# Patient Record
Sex: MALE | Race: WHITE | NOT HISPANIC OR LATINO | Employment: FULL TIME | URBAN - METROPOLITAN AREA
[De-identification: names, ages, dates, MRNs, and addresses within clinical notes are randomized per-mention and may not be internally consistent; named-entity substitution may affect disease eponyms.]

---

## 2022-02-04 ENCOUNTER — OFFICE VISIT (OUTPATIENT)
Dept: FAMILY MEDICINE | Facility: CLINIC | Age: 67
End: 2022-02-04
Payer: COMMERCIAL

## 2022-02-04 VITALS
WEIGHT: 315 LBS | RESPIRATION RATE: 20 BRPM | HEIGHT: 76 IN | BODY MASS INDEX: 38.36 KG/M2 | DIASTOLIC BLOOD PRESSURE: 112 MMHG | HEART RATE: 86 BPM | SYSTOLIC BLOOD PRESSURE: 178 MMHG | OXYGEN SATURATION: 93 % | TEMPERATURE: 98.2 F

## 2022-02-04 DIAGNOSIS — E66.01 MORBID OBESITY (H): ICD-10-CM

## 2022-02-04 DIAGNOSIS — I10 BENIGN ESSENTIAL HYPERTENSION: ICD-10-CM

## 2022-02-04 DIAGNOSIS — R73.9 HYPERGLYCEMIA: ICD-10-CM

## 2022-02-04 DIAGNOSIS — Z13.220 LIPID SCREENING: ICD-10-CM

## 2022-02-04 DIAGNOSIS — B35.1 ONYCHOMYCOSIS: ICD-10-CM

## 2022-02-04 DIAGNOSIS — I89.0 LYMPHEDEMA: ICD-10-CM

## 2022-02-04 DIAGNOSIS — L03.115 CELLULITIS OF RIGHT LOWER EXTREMITY: Primary | ICD-10-CM

## 2022-02-04 LAB
ALBUMIN SERPL-MCNC: 3.5 G/DL (ref 3.4–5)
ALP SERPL-CCNC: 53 U/L (ref 40–150)
ALT SERPL W P-5'-P-CCNC: 29 U/L (ref 0–70)
ANION GAP SERPL CALCULATED.3IONS-SCNC: 2 MMOL/L (ref 3–14)
AST SERPL W P-5'-P-CCNC: 15 U/L (ref 0–45)
BASOPHILS # BLD AUTO: 0 10E3/UL (ref 0–0.2)
BASOPHILS NFR BLD AUTO: 1 %
BILIRUB SERPL-MCNC: 0.5 MG/DL (ref 0.2–1.3)
BUN SERPL-MCNC: 14 MG/DL (ref 7–30)
CALCIUM SERPL-MCNC: 9.1 MG/DL (ref 8.5–10.1)
CHLORIDE BLD-SCNC: 108 MMOL/L (ref 94–109)
CHOLEST SERPL-MCNC: 145 MG/DL
CO2 SERPL-SCNC: 32 MMOL/L (ref 20–32)
CREAT SERPL-MCNC: 0.9 MG/DL (ref 0.66–1.25)
EOSINOPHIL # BLD AUTO: 0.2 10E3/UL (ref 0–0.7)
EOSINOPHIL NFR BLD AUTO: 4 %
ERYTHROCYTE [DISTWIDTH] IN BLOOD BY AUTOMATED COUNT: 14.7 % (ref 10–15)
FASTING STATUS PATIENT QL REPORTED: NO
GFR SERPL CREATININE-BSD FRML MDRD: >90 ML/MIN/1.73M2
GLUCOSE BLD-MCNC: 121 MG/DL (ref 70–99)
HBA1C MFR BLD: 6 % (ref 0–5.6)
HCT VFR BLD AUTO: 48.2 % (ref 40–53)
HDLC SERPL-MCNC: 50 MG/DL
HGB BLD-MCNC: 15.6 G/DL (ref 13.3–17.7)
LDLC SERPL CALC-MCNC: 82 MG/DL
LYMPHOCYTES # BLD AUTO: 1.3 10E3/UL (ref 0.8–5.3)
LYMPHOCYTES NFR BLD AUTO: 21 %
MCH RBC QN AUTO: 29.7 PG (ref 26.5–33)
MCHC RBC AUTO-ENTMCNC: 32.4 G/DL (ref 31.5–36.5)
MCV RBC AUTO: 92 FL (ref 78–100)
MONOCYTES # BLD AUTO: 0.6 10E3/UL (ref 0–1.3)
MONOCYTES NFR BLD AUTO: 10 %
NEUTROPHILS # BLD AUTO: 4 10E3/UL (ref 1.6–8.3)
NEUTROPHILS NFR BLD AUTO: 65 %
NONHDLC SERPL-MCNC: 95 MG/DL
PLATELET # BLD AUTO: 176 10E3/UL (ref 150–450)
POTASSIUM BLD-SCNC: 4.2 MMOL/L (ref 3.4–5.3)
PROT SERPL-MCNC: 7 G/DL (ref 6.8–8.8)
RBC # BLD AUTO: 5.25 10E6/UL (ref 4.4–5.9)
SODIUM SERPL-SCNC: 142 MMOL/L (ref 133–144)
TRIGL SERPL-MCNC: 66 MG/DL
WBC # BLD AUTO: 6.2 10E3/UL (ref 4–11)

## 2022-02-04 PROCEDURE — 80053 COMPREHEN METABOLIC PANEL: CPT | Performed by: INTERNAL MEDICINE

## 2022-02-04 PROCEDURE — 36415 COLL VENOUS BLD VENIPUNCTURE: CPT | Performed by: INTERNAL MEDICINE

## 2022-02-04 PROCEDURE — 83036 HEMOGLOBIN GLYCOSYLATED A1C: CPT | Performed by: INTERNAL MEDICINE

## 2022-02-04 PROCEDURE — 85025 COMPLETE CBC W/AUTO DIFF WBC: CPT | Performed by: INTERNAL MEDICINE

## 2022-02-04 PROCEDURE — 80061 LIPID PANEL: CPT | Performed by: INTERNAL MEDICINE

## 2022-02-04 PROCEDURE — 99204 OFFICE O/P NEW MOD 45 MIN: CPT | Performed by: INTERNAL MEDICINE

## 2022-02-04 RX ORDER — LOSARTAN POTASSIUM AND HYDROCHLOROTHIAZIDE 12.5; 5 MG/1; MG/1
1 TABLET ORAL DAILY
Qty: 90 TABLET | Refills: 1 | Status: SHIPPED | OUTPATIENT
Start: 2022-02-04 | End: 2022-08-04

## 2022-02-04 RX ORDER — DOXYCYCLINE HYCLATE 100 MG
100 TABLET ORAL 2 TIMES DAILY
Qty: 14 TABLET | Refills: 0 | Status: SHIPPED | OUTPATIENT
Start: 2022-02-04 | End: 2022-02-11

## 2022-02-04 ASSESSMENT — PAIN SCALES - GENERAL: PAINLEVEL: NO PAIN (0)

## 2022-02-04 ASSESSMENT — MIFFLIN-ST. JEOR: SCORE: 2705.89

## 2022-02-04 NOTE — PATIENT INSTRUCTIONS
LEG INFECTION:  -START DOXYCYCLINE twice a day for a week.  -Continue the compression. Put the legs up when you can  -IF things are not improving please let us know!    BLOOD PRESSURE:  START- LOSARTAN/HCTZ once a day every morning.    There are many other issues that we should begin to tackle.  -preventive care (cancer screenings)  -metabolism evaluation/weight   -toe fungus    I recommend being seen again in 2-3 weeks to check on the infection and blood pressure!

## 2022-02-04 NOTE — PROGRESS NOTES
"Joey Lyle is a 66 year old patient who comes in today for a Clinic visit.  Initial BP:  BP (!) 173/117 (BP Location: Right arm, Cuff Size: Adult Large)   Pulse 86   Temp 98.2  F (36.8  C) (Temporal)   Resp 20   Ht 1.93 m (6' 4\")   Wt (!) 181.4 kg (400 lb)   SpO2 93%   BMI 48.69 kg/m       86  Disposition: provider notified while patient in the clinic    " Post-Care Instructions: Patient instructed to not lie down for 4 hours and limit physical activity for 24 hours. Patient instructed not to travel by airplane for 48 hours.

## 2022-02-04 NOTE — PROGRESS NOTES
Assessment & Plan     This is a 66-year-old morbidly obese gentleman who has not really had routine medical care.  Although he is here for a cellulitis of the right lower extremity he has findings and features that do require further evaluation.  Namely screening for diabetes sleep apnea heart disease, and aggressive interventions for weight loss.    Cellulitis of right lower extremity  Patient has evidence of lower extremity cellulitis.  He does have significant bipedal edema, which may be from lymphedema.  This would of course be secondary to his morbid obesity.  We will start antibiotics, compression and elevation.  Signs and symptoms of worsening were discussed with the patient.  Return precautions discussed.  - CBC with platelets and differential  - Comprehensive metabolic panel (BMP + Alb, Alk Phos, ALT, AST, Total. Bili, TP)  - doxycycline hyclate (VIBRA-TABS) 100 MG tablet  Dispense: 14 tablet; Refill: 0    Lymphedema  Likely due to morbid obesity.  Cardiorespiratory exam is benign today.  However given the edema and his body habitus further work-up is warranted.  I suspect at the very least he has obstructive sleep apnea.  Patient declines a thorough work-up today, citing cost barriers and insurance.  He is keen on expectant management until he is Medicare eligible.  - CBC with platelets and differential  - Comprehensive metabolic panel (BMP + Alb, Alk Phos, ALT, AST, Total. Bili, TP)    Morbid obesity (H)  Weight loss is of course encouraged.  The patient states he plans to lose about 2 to 3 pounds a week.  We will assess his progress at his next follow-up.    Benign essential hypertension  Substantially high.  We did discuss signs of ACS and CVA.  He does agree to medication, and we are choosing a diuretic component due to the lymphedema.  - losartan-hydrochlorothiazide (HYZAAR) 50-12.5 MG tablet  Dispense: 90 tablet; Refill: 1    Onychomycosis  This should be addressed as he has onychomycosis of all  "toes.  Potential source of the cellulitis.  I think he should see podiatry but he is not ready to move forward with that today.  - CBC with platelets and differential  - Comprehensive metabolic panel (BMP + Alb, Alk Phos, ALT, AST, Total. Bili, TP)    Lipid screening    - Lipid panel reflex to direct LDL Fasting        52 minutes spent on the date of the encounter doing chart review, review of outside records, patient visit and documentation        BMI:   Estimated body mass index is 48.69 kg/m  as calculated from the following:    Height as of this encounter: 1.93 m (6' 4\").    Weight as of this encounter: 181.4 kg (400 lb).   Weight management plan: Discussed healthy diet and exercise guidelines Will need to discuss further    See AVS    Return in about 2 weeks (around 2/18/2022) for Follow up.    Boris Wilcox MD  [facility] JOSE Cook is a 66 year old who presents for the following health issues     HPI     Edema, Cellulitis concerns of the lower right leg.     Last week he noted redness and swelling in the RLE.    No chills, fever, diarrhea    LLE cellulitis requiring hospital stay in 2012 ([place])        Review of Systems   Constitutional, HEENT, cardiovascular, pulmonary, gi and gu systems are negative, except as otherwise noted.      Objective    BP (!) 173/117 (BP Location: Right arm, Cuff Size: Adult Large)   Pulse 86   Temp 98.2  F (36.8  C) (Temporal)   Resp 20   Ht 1.93 m (6' 4\")   Wt (!) 181.4 kg (400 lb)   SpO2 93%   BMI 48.69 kg/m    Body mass index is 48.69 kg/m .  Physical Exam   GENERAL: healthy, alert and no distress  NECK: no adenopathy, no asymmetry, masses, or scars and thyroid normal to palpation  RESP: lungs clear to auscultation - no rales, rhonchi or wheezes  CV: regular rate and rhythm, normal S1 S2, no S3 or S4, no murmur, click or rub, no peripheral edema and peripheral pulses strong  ABDOMEN: soft, nontender, no hepatosplenomegaly, no masses and " bowel sounds normal  MS: no gross musculoskeletal defects noted, no edema  SKIN: no suspicious lesions or rashes.  Stasis dermatitis LLE.  RLE erythema and warmth c/w cellulitis.  LYMPH: no cervical, supraclavicular, axillary, or inguinal adenopathy.    EXT:  BIPEDAL PITTING EDEMA.  Marked onychomycosis B

## 2022-02-06 PROBLEM — R73.9 HYPERGLYCEMIA: Status: ACTIVE | Noted: 2022-02-06

## 2022-02-11 ENCOUNTER — TELEPHONE (OUTPATIENT)
Dept: FAMILY MEDICINE | Facility: CLINIC | Age: 67
End: 2022-02-11
Payer: COMMERCIAL

## 2022-02-11 DIAGNOSIS — L03.115 CELLULITIS OF RIGHT LOWER EXTREMITY: ICD-10-CM

## 2022-02-11 RX ORDER — DOXYCYCLINE HYCLATE 100 MG
100 TABLET ORAL 2 TIMES DAILY
Qty: 14 TABLET | Refills: 0 | Status: SHIPPED | OUTPATIENT
Start: 2022-02-11 | End: 2022-02-23

## 2022-02-11 NOTE — TELEPHONE ENCOUNTER
Pt asked if he should get a refill for doxycyline. He finished Rx today and notes improvement of right lower extremity cellulitis, but still has redness. Denies pain, fever or drainage and swelling also improved with Hyzaar.     Pt uses [id number] - [place], [place] - [address] if Rx refill is appropriate.       Please advice.

## 2022-02-11 NOTE — TELEPHONE ENCOUNTER
Triage called pt back. Left voice message informing pt doctor Brenden extended abx for 1 more week. Advised he check with pharmacy when Rx is ready for .    141

## 2022-02-23 ENCOUNTER — OFFICE VISIT (OUTPATIENT)
Dept: FAMILY MEDICINE | Facility: CLINIC | Age: 67
End: 2022-02-23
Payer: COMMERCIAL

## 2022-02-23 VITALS
TEMPERATURE: 97.1 F | WEIGHT: 315 LBS | DIASTOLIC BLOOD PRESSURE: 97 MMHG | RESPIRATION RATE: 20 BRPM | SYSTOLIC BLOOD PRESSURE: 148 MMHG | BODY MASS INDEX: 38.36 KG/M2 | HEIGHT: 76 IN | OXYGEN SATURATION: 94 % | HEART RATE: 82 BPM

## 2022-02-23 DIAGNOSIS — I89.0 LYMPHEDEMA: ICD-10-CM

## 2022-02-23 DIAGNOSIS — I10 BENIGN ESSENTIAL HYPERTENSION: ICD-10-CM

## 2022-02-23 DIAGNOSIS — L03.115 CELLULITIS OF RIGHT LOWER EXTREMITY: Primary | ICD-10-CM

## 2022-02-23 DIAGNOSIS — E66.01 MORBID OBESITY (H): ICD-10-CM

## 2022-02-23 PROCEDURE — 99214 OFFICE O/P EST MOD 30 MIN: CPT | Performed by: INTERNAL MEDICINE

## 2022-02-23 RX ORDER — FUROSEMIDE 40 MG
40 TABLET ORAL DAILY
Qty: 10 TABLET | Refills: 0 | Status: SHIPPED | OUTPATIENT
Start: 2022-02-23 | End: 2022-03-05

## 2022-02-23 ASSESSMENT — PAIN SCALES - GENERAL: PAINLEVEL: NO PAIN (0)

## 2022-02-23 NOTE — PATIENT INSTRUCTIONS
FUROSEMIDE:  Take 40mg a day for 10 days    See me again when you get onto Medicare.    We should tackle other preventive care such as sleep apnea screening, weight loss, colon cancer screening.

## 2022-02-23 NOTE — NURSING NOTE
"      Initial BP:  BP (!) 148/97 (BP Location: Left arm, Patient Position: Sitting, Cuff Size: Adult Large)   Pulse 82   Temp 97.1  F (36.2  C) (Temporal)   Resp 20   Ht 1.93 m (6' 4\")   Wt (!) 176.4 kg (389 lb)   SpO2 94%   BMI 47.35 kg/m       82  Disposition: provider notified while patient in the clinic      Iza Lee CMA    "

## 2022-02-23 NOTE — PROGRESS NOTES
"  Assessment & Plan     Cellulitis of right lower extremity  Improved.  Return precautions discussed.  No further abx indicated at this time.      Benign essential hypertension  Improved with medication. Continue.  Will add short course lasix for the persistent pedal edema.      Morbid obesity (H)  He has a recumbent bike and is planning to use.             See Patient Instructions    No follow-ups on file.    Boris Wilcox MD  [facility] JOSE Cook is a 66 year old who presents for the following health issues     History of Present Illness     Reason for visit:  Follow up to previous appointment for cellulitis.  Symptom onset:  3-4 weeks ago  Symptom intensity:  Mild  Symptom progression:  Improving  Had these symptoms before:  Yes  Has tried/received treatment for these symptoms:  Yes  Previous treatment was successful:  Yes  Prior treatment description:  Cellulitis in other leg 10 years ago 5 day stay @ [facility].  What makes it worse:  No  What makes it better:  No    He eats 2-3 servings of fruits and vegetables daily.He consumes 0 sweetened beverage(s) daily.He exercises with enough effort to increase his heart rate 9 or less minutes per day.  He exercises with enough effort to increase his heart rate 3 or less days per week.   He is taking medications regularly.       He is here in follow-up.  Seen earlier this month as a new patient.  He has morbid obesity and significant lower extremity events as a result.    We treated for cellulitis last visit and extended the course.      He also agreed to treatment for HTN.      Recall that he has not had routine primary care for years.          Review of Systems   As above      Objective    BP (!) 148/97 (BP Location: Left arm, Patient Position: Sitting, Cuff Size: Adult Large)   Pulse 82   Temp 97.1  F (36.2  C) (Temporal)   Resp 20   Ht 1.93 m (6' 4\")   Wt (!) 176.4 kg (389 lb)   SpO2 94%   BMI 47.35 kg/m    Body " mass index is 47.35 kg/m .   Wt Readings from Last 3 Encounters:   02/23/22 (!) 176.4 kg (389 lb)   02/04/22 (!) 181.4 kg (400 lb)       Physical Exam   GENERAL: healthy, alert and no distress  NECK: no adenopathy, no asymmetry, masses, or scars and thyroid normal to palpation  RESP: lungs clear to auscultation - no rales, rhonchi or wheezes  CV: regular rate and rhythm, normal S1 S2, no S3 or S4, no murmur, click or rub, no peripheral edema and peripheral pulses strong  ABDOMEN: soft, nontender, no hepatosplenomegaly, no masses and bowel sounds normal  BIPEDAL edema.  No e/o cellulitis

## 2022-05-29 ENCOUNTER — HEALTH MAINTENANCE LETTER (OUTPATIENT)
Age: 67
End: 2022-05-29

## 2022-08-04 DIAGNOSIS — I10 BENIGN ESSENTIAL HYPERTENSION: ICD-10-CM

## 2022-08-04 RX ORDER — LOSARTAN POTASSIUM AND HYDROCHLOROTHIAZIDE 12.5; 5 MG/1; MG/1
TABLET ORAL
Qty: 90 TABLET | Refills: 0 | Status: SHIPPED | OUTPATIENT
Start: 2022-08-04 | End: 2022-11-22

## 2022-08-04 NOTE — TELEPHONE ENCOUNTER
Routing refill request to provider for review/approval because:  BP out of range:        BP Readings from Last 3 Encounters:   02/23/22 (!) 148/97   02/04/22 (!) 178/112

## 2022-10-03 ENCOUNTER — HEALTH MAINTENANCE LETTER (OUTPATIENT)
Age: 67
End: 2022-10-03

## 2022-11-19 DIAGNOSIS — I10 BENIGN ESSENTIAL HYPERTENSION: ICD-10-CM

## 2022-11-22 RX ORDER — LOSARTAN POTASSIUM AND HYDROCHLOROTHIAZIDE 12.5; 5 MG/1; MG/1
TABLET ORAL
Qty: 90 TABLET | Refills: 0 | Status: SHIPPED | OUTPATIENT
Start: 2022-11-22 | End: 2023-02-27

## 2022-11-22 NOTE — TELEPHONE ENCOUNTER
Routing refill request to provider for review/approval because:  BP Readings from Last 6 Encounters:   02/23/22 (!) 148/97   02/04/22 (!) 178/112       Taya Claros RN

## 2023-01-09 NOTE — LETTER
February 4, 2022      Joey Lyle  [id number] [address] [address]        Dear ,    We are writing to inform you of your test results.    Mr. Lyle,     Most of your labs are reassuringly normal. Sugar (glucose) is a bit high. I'm having the lab run a specific diabetes test.  If there's any concern we'll reach out.             Resulted Orders   Lipid panel reflex to direct LDL Fasting   Result Value Ref Range    Cholesterol 145 <200 mg/dL    Triglycerides 66 <150 mg/dL    Direct Measure HDL 50 >=40 mg/dL    LDL Cholesterol Calculated 82 <=100 mg/dL    Non HDL Cholesterol 95 <130 mg/dL    Patient Fasting > 8hrs? No     Narrative    Cholesterol  Desirable:  <200 mg/dL    Triglycerides  Normal:  Less than 150 mg/dL  Borderline High:  150-199 mg/dL  High:  200-499 mg/dL  Very High:  Greater than or equal to 500 mg/dL    Direct Measure HDL  Female:  Greater than or equal to 50 mg/dL   Male:  Greater than or equal to 40 mg/dL    LDL Cholesterol  Desirable:  <100mg/dL  Above Desirable:  100-129 mg/dL   Borderline High:  130-159 mg/dL   High:  160-189 mg/dL   Very High:  >= 190 mg/dL    Non HDL Cholesterol  Desirable:  130 mg/dL  Above Desirable:  130-159 mg/dL  Borderline High:  160-189 mg/dL  High:  190-219 mg/dL  Very High:  Greater than or equal to 220 mg/dL   Comprehensive metabolic panel (BMP + Alb, Alk Phos, ALT, AST, Total. Bili, TP)   Result Value Ref Range    Sodium 142 133 - 144 mmol/L    Potassium 4.2 3.4 - 5.3 mmol/L    Chloride 108 94 - 109 mmol/L    Carbon Dioxide (CO2) 32 20 - 32 mmol/L    Anion Gap 2 (L) 3 - 14 mmol/L    Urea Nitrogen 14 7 - 30 mg/dL    Creatinine 0.90 0.66 - 1.25 mg/dL    Calcium 9.1 8.5 - 10.1 mg/dL    Glucose 121 (H) 70 - 99 mg/dL    Alkaline Phosphatase 53 40 - 150 U/L    AST 15 0 - 45 U/L    ALT 29 0 - 70 U/L    Protein Total 7.0 6.8 - 8.8 g/dL    Albumin 3.5 3.4 - 5.0 g/dL    Bilirubin Total 0.5 0.2 - 1.3 mg/dL    GFR Estimate >90 >60 mL/min/1.73m2      Comment:       Effective December 21, 2021 eGFRcr in adults is calculated using the 2021 CKD-EPI creatinine equation which includes age and gender (Ani et al., NEJ, DOI: 10.1056/YEEJus8816108)   CBC with platelets and differential   Result Value Ref Range    WBC Count 6.2 4.0 - 11.0 10e3/uL    RBC Count 5.25 4.40 - 5.90 10e6/uL    Hemoglobin 15.6 13.3 - 17.7 g/dL    Hematocrit 48.2 40.0 - 53.0 %    MCV 92 78 - 100 fL    MCH 29.7 26.5 - 33.0 pg    MCHC 32.4 31.5 - 36.5 g/dL    RDW 14.7 10.0 - 15.0 %    Platelet Count 176 150 - 450 10e3/uL    % Neutrophils 65 %    % Lymphocytes 21 %    % Monocytes 10 %    % Eosinophils 4 %    % Basophils 1 %    Absolute Neutrophils 4.0 1.6 - 8.3 10e3/uL    Absolute Lymphocytes 1.3 0.8 - 5.3 10e3/uL    Absolute Monocytes 0.6 0.0 - 1.3 10e3/uL    Absolute Eosinophils 0.2 0.0 - 0.7 10e3/uL    Absolute Basophils 0.0 0.0 - 0.2 10e3/uL       If you have any questions or concerns, please call the clinic at the number listed above.       Sincerely,      Boris Wilcox MD           [phone]

## 2023-02-12 ENCOUNTER — HEALTH MAINTENANCE LETTER (OUTPATIENT)
Age: 68
End: 2023-02-12

## 2023-02-25 DIAGNOSIS — I10 BENIGN ESSENTIAL HYPERTENSION: ICD-10-CM

## 2023-02-27 RX ORDER — LOSARTAN POTASSIUM AND HYDROCHLOROTHIAZIDE 12.5; 5 MG/1; MG/1
TABLET ORAL
Qty: 30 TABLET | Refills: 0 | Status: SHIPPED | OUTPATIENT
Start: 2023-02-27 | End: 2023-03-30

## 2023-03-30 ENCOUNTER — TELEPHONE (OUTPATIENT)
Dept: FAMILY MEDICINE | Facility: CLINIC | Age: 68
End: 2023-03-30
Payer: COMMERCIAL

## 2023-03-30 DIAGNOSIS — I10 BENIGN ESSENTIAL HYPERTENSION: ICD-10-CM

## 2023-03-30 RX ORDER — LOSARTAN POTASSIUM AND HYDROCHLOROTHIAZIDE 12.5; 5 MG/1; MG/1
TABLET ORAL
Qty: 30 TABLET | Refills: 0 | Status: SHIPPED | OUTPATIENT
Start: 2023-03-30 | End: 2023-04-27

## 2023-03-30 NOTE — TELEPHONE ENCOUNTER
Rx for #30 sent. Pt not seen in over a year.  Needs a visit  Boris Wilcox MD on 3/30/2023 at 3:43 PM

## 2023-04-27 DIAGNOSIS — I10 BENIGN ESSENTIAL HYPERTENSION: ICD-10-CM

## 2023-04-27 RX ORDER — LOSARTAN POTASSIUM AND HYDROCHLOROTHIAZIDE 12.5; 5 MG/1; MG/1
TABLET ORAL
Qty: 30 TABLET | Refills: 0 | Status: SHIPPED | OUTPATIENT
Start: 2023-04-27 | End: 2023-05-26

## 2023-05-26 DIAGNOSIS — I10 BENIGN ESSENTIAL HYPERTENSION: ICD-10-CM

## 2023-05-26 RX ORDER — LOSARTAN POTASSIUM AND HYDROCHLOROTHIAZIDE 12.5; 5 MG/1; MG/1
TABLET ORAL
Qty: 30 TABLET | Refills: 0 | Status: SHIPPED | OUTPATIENT
Start: 2023-05-26 | End: 2023-06-23

## 2023-06-23 DIAGNOSIS — I10 BENIGN ESSENTIAL HYPERTENSION: ICD-10-CM

## 2023-06-23 RX ORDER — LOSARTAN POTASSIUM AND HYDROCHLOROTHIAZIDE 12.5; 5 MG/1; MG/1
TABLET ORAL
Qty: 30 TABLET | Refills: 0 | Status: SHIPPED | OUTPATIENT
Start: 2023-06-23 | End: 2023-07-18

## 2023-07-18 DIAGNOSIS — I10 BENIGN ESSENTIAL HYPERTENSION: ICD-10-CM

## 2023-07-18 RX ORDER — LOSARTAN POTASSIUM AND HYDROCHLOROTHIAZIDE 12.5; 5 MG/1; MG/1
TABLET ORAL
Qty: 30 TABLET | Refills: 0 | Status: SHIPPED | OUTPATIENT
Start: 2023-07-18 | End: 2023-08-03

## 2023-08-03 DIAGNOSIS — I10 BENIGN ESSENTIAL HYPERTENSION: ICD-10-CM

## 2023-08-03 RX ORDER — LOSARTAN POTASSIUM AND HYDROCHLOROTHIAZIDE 12.5; 5 MG/1; MG/1
TABLET ORAL
Qty: 30 TABLET | Refills: 0 | Status: SHIPPED | OUTPATIENT
Start: 2023-08-03 | End: 2023-11-24

## 2023-08-28 DIAGNOSIS — I10 BENIGN ESSENTIAL HYPERTENSION: ICD-10-CM

## 2023-08-29 RX ORDER — LOSARTAN POTASSIUM AND HYDROCHLOROTHIAZIDE 12.5; 5 MG/1; MG/1
TABLET ORAL
Qty: 90 TABLET | Refills: 1 | OUTPATIENT
Start: 2023-08-29

## 2023-11-23 ENCOUNTER — TELEPHONE (OUTPATIENT)
Dept: FAMILY MEDICINE | Facility: CLINIC | Age: 68
End: 2023-11-23
Payer: COMMERCIAL

## 2023-11-23 DIAGNOSIS — I10 BENIGN ESSENTIAL HYPERTENSION: ICD-10-CM

## 2023-11-24 RX ORDER — LOSARTAN POTASSIUM AND HYDROCHLOROTHIAZIDE 12.5; 5 MG/1; MG/1
TABLET ORAL
Qty: 30 TABLET | Refills: 0 | Status: SHIPPED | OUTPATIENT
Start: 2023-11-24 | End: 2023-12-21

## 2023-12-21 DIAGNOSIS — I10 BENIGN ESSENTIAL HYPERTENSION: ICD-10-CM

## 2023-12-21 RX ORDER — LOSARTAN POTASSIUM AND HYDROCHLOROTHIAZIDE 12.5; 5 MG/1; MG/1
TABLET ORAL
Qty: 30 TABLET | Refills: 0 | Status: SHIPPED | OUTPATIENT
Start: 2023-12-21 | End: 2024-01-17

## 2024-01-17 DIAGNOSIS — I10 BENIGN ESSENTIAL HYPERTENSION: ICD-10-CM

## 2024-01-17 RX ORDER — LOSARTAN POTASSIUM AND HYDROCHLOROTHIAZIDE 12.5; 5 MG/1; MG/1
TABLET ORAL
Qty: 90 TABLET | Refills: 1 | Status: SHIPPED | OUTPATIENT
Start: 2024-01-17 | End: 2024-07-12

## 2024-03-10 ENCOUNTER — HEALTH MAINTENANCE LETTER (OUTPATIENT)
Age: 69
End: 2024-03-10

## 2024-07-12 DIAGNOSIS — I10 BENIGN ESSENTIAL HYPERTENSION: ICD-10-CM

## 2024-07-12 RX ORDER — LOSARTAN POTASSIUM AND HYDROCHLOROTHIAZIDE 12.5; 5 MG/1; MG/1
TABLET ORAL
Qty: 90 TABLET | Refills: 1 | Status: SHIPPED | OUTPATIENT
Start: 2024-07-12

## 2024-10-10 ENCOUNTER — TRANSFERRED RECORDS (OUTPATIENT)
Dept: HEALTH INFORMATION MANAGEMENT | Facility: CLINIC | Age: 69
End: 2024-10-10
Payer: COMMERCIAL

## 2025-06-21 ENCOUNTER — OFFICE VISIT (OUTPATIENT)
Dept: ORTHOPEDICS | Facility: CLINIC | Age: 70
End: 2025-06-21
Payer: COMMERCIAL

## 2025-06-21 VITALS — HEIGHT: 76 IN | WEIGHT: 315 LBS | BODY MASS INDEX: 38.36 KG/M2

## 2025-06-21 DIAGNOSIS — M79.604 PAIN AND SWELLING OF RIGHT LOWER EXTREMITY: Primary | ICD-10-CM

## 2025-06-21 DIAGNOSIS — L03.115 CELLULITIS OF RIGHT LOWER EXTREMITY: ICD-10-CM

## 2025-06-21 DIAGNOSIS — M79.89 PAIN AND SWELLING OF RIGHT LOWER EXTREMITY: Primary | ICD-10-CM

## 2025-06-21 PROCEDURE — 99203 OFFICE O/P NEW LOW 30 MIN: CPT | Performed by: STUDENT IN AN ORGANIZED HEALTH CARE EDUCATION/TRAINING PROGRAM

## 2025-06-21 RX ORDER — CEPHALEXIN 500 MG/1
500 CAPSULE ORAL EVERY 6 HOURS
Qty: 28 CAPSULE | Refills: 0 | Status: SHIPPED | OUTPATIENT
Start: 2025-06-21 | End: 2025-06-28

## 2025-06-21 NOTE — PATIENT INSTRUCTIONS
Joey Lyle, It was nice to see you in our office today.      DIAGNOSIS:   1. Pain and swelling of right lower extremity    2. Cellulitis of right lower extremity      INSTRUCTIONS FOR FOLLOW-UP CARE:  Activity Modification: As tolerated  Imaging/Tests: Doppler Ultrasound ordered to evaluate for DVT  Medication: Keflex prescription sent to pharmacy for cellulitis   Follow-up Plan: Follow-up with PCP next week for evaluation of antibiotic response and management as needed    CLINIC LOCATIONS:     [place] [facility]:  [phone] [address], [address] [place] HELP LINE: [phone]   [place] [address] TRIAGE LINE: [phone]   (Monday & Friday) APPOINTMENTS: [phone]    RADIOLOGY: [phone]   [facility] [phone]   [address] PHYSICAL & OCCUPATIONAL THERAPY: [phone]*   [place] [address] BILLING QUESTIONS: [phone]   (Tuesday & Thursday) FAX: [phone]       *Therapy locations that offer Saturday options: [place], [place], [place]    Thank you for choosing [facility]!    If you have any questions, please do not hesitate to reach out on [employer] or Call [phone] and ask for my team.    Saida Peacock MD, [facility]

## 2025-06-21 NOTE — PROGRESS NOTES
SPORTS MEDICINE CLINIC NEW PATIENT VISIT    REFERRAL SOURCE: No ref. provider found    PATIENT'S GOAL FOR APPOINTMENT: Wants to know if the redness is concerning or not    HISTORY OF PRESENT ILLNESS  Joey Lyle is a 69 year old male presenting as a new patient with right lower leg pain    When did problem start?/Trauma associated with onset?:  6/8/2025, patient fell on concrete and scraped his right lower leg.  There is tenderness to the area and redness in the area.  Patient has a history of cellulitis to the left lower extremity.    Location & description of pain:  Right lower leg, anterior, redness and swelling    Exacerbating factors: pressure to area    Remitting factors:rest    Previous Treatments:  -Medications: antibiotic cream  -Rehabilitation: non  -Durable Medical Equipment:no  -Injections: no  -Modalities: no  -Other Providers seen: no    Sports, Hobbies, Employment:  Sales    Average hours of sleep per night: 6 + a nap for 1 hour    Average minutes of exercise per day: Daily - no    Area of Problem  Date 1  Date 2 Date 3 Date 4 Date 5   Function Ability in last week - % of Baseline (0 is worst & 100 is best)        Sport/Activity Ability in last week - % of Baseline (0 is worst & 100 is best)        Pain Level in the last week (0 is best & 10 is worst)          Additional Information of consideration:  -Poor Balance? Yes, has arthritis in right knee, being treated at [facility] for this.  -Numbness/paresthesias in extremities? no    MEDICATIONS    Current Outpatient Medications:     furosemide (LASIX) 40 MG tablet, Take 1 tablet (40 mg) by mouth daily for 10 days, Disp: 10 tablet, Rfl: 0    losartan-hydrochlorothiazide (HYZAAR) 50-12.5 MG tablet, TAKE 1 TABLET BY MOUTH EVERY DAY - OVERDUE FOR CLINIC VISIT PER MD, Disp: 90 tablet, Rfl: 1    ALLERGIES  Allergies   Allergen Reactions    Sulfa Antibiotics Dermatitis and Rash       PAST MEDICAL HISTORY  No past medical history on file.    PAST SURGICAL  HISTORY  No past surgical history on file.    SOCIAL HISTORY  Social History     Tobacco Use    Smoking status: Former     Current packs/day: 0.00     Types: Cigarettes     Quit date:      Years since quittin.4    Smokeless tobacco: Never   Substance Use Topics    Alcohol use: Yes    Drug use: Never       FAMILY HISTORY  No family history on file.    REVIEW OF SYSTEMS  Complete 12 system Review of Systems performed and was negative except for HPI.    VITALS  There were no vitals filed for this visit.    PHYSICAL EXAMINATION ***  General: Age appropriate appearing, no acute distress  HEENT: normocephalic, atraumatic, sclera non-icteric  Skin: No open skin lesion noted in visible areas.  Respiratory: Non labored breathing, No wheezes.  Cardiac/Vessels: No edema, cyanosis, clubbing noted in all extremities.  Lymph: No palpable lymph node swelling noted around the affected area.  Mental: There was no signs of aberrant behaviors noted. Patient was pleasant throughout the encounter.    Functional Movements: Non-antalgic gait appreciated. Able to complete squat, balance on each leg, walk on heels and walk on toes without difficulty.     RIGHT KNEE:   Inspection: No deformities, atrophy, effusion, warmth ***  Palpation: ***  Range of Motion (Estimated, Active unless otherwise noted):Flexion 150 degrees, extension to neutral.   Reflexes: Patella [2/2], Achilles [2/2]  Sensation:  Intact to light touch bilaterally at the anterior thigh, medial knee, medial malleoli, dorsum of foot, and lateral malleoli.  Proprioception intact in both big toes ***  Strength [L (0-5) / R (0-5)]:   Hip: Flexion [5/5], Abduction [5/5], Adduction [5/5]  Knee: Extension [5/5], Flexion [5/5]  Ankle: Eversion [5/5], Inversion [5/5], Dorsiflexion [5/5], Plantarflexion [5/5]  Great Toe: Extension [5/5]    Special Testing:   Testing: The following special tests were negative: Valgus stress test, Varus stress test, Lachman's test, Anterior  drawer test, Posterior drawer test, Meghann's test, Thessaly test,  Patellofemoral grind test    LEFT KNEE:   Inspection: No deformities, atrophy, effusion, warmth ***  Palpation: ***  Range of Motion (Estimated, Active unless otherwise noted):Flexion 150 degrees, extension to neutral.   Reflexes: Patella [2/2], Achilles [2/2]  Sensation:  Intact to light touch bilaterally at the anterior thigh, medial knee, medial malleoli, dorsum of foot, and lateral malleoli.  Proprioception intact in both big toes ***  Strength [L (0-5) / R (0-5)]:   Hip: Flexion [5/5], Abduction [5/5], Adduction [5/5]  Knee: Extension [5/5], Flexion [5/5]  Ankle: Eversion [5/5], Inversion [5/5], Dorsiflexion [5/5], Plantarflexion [5/5]  Great Toe: Extension [5/5]    Special Testing:   Testing: The following special tests were negative: Valgus stress test, Varus stress test, Lachman's test, Anterior drawer test, Posterior drawer test, Meghann's test, Thessaly test,  Patellofemoral grind test    IMAGING STUDIES:  Put Date here *** XR/MRI,etc    I personally reviewed these images and agree with the radiology report and shared the findings with the patient.    NAVNEET Lyle is a very pleasant 69 year old male who is presenting today with *** knee pain that seems related to ***    PLAN  The following was discussed with the patient:  Activity Modification: ***  Imaging/Tests: ***  Rehabilitation: ***  Orthotics/Bracing: ***  Medication: ***  Interventions: ***  Referral: ***  Follow-up Plan: ***  Resources Provided: ***    They were encouraged to message me on [employer] whenever they needed.    The patient was in agreement with this plan. All questions were answered to the best of my ability.    Saida Peacock MD, [facility]  Sports Medicine Attending Physician  Department of Physical Medicine & Rehabilitation

## 2025-06-21 NOTE — LETTER
6/21/2025      Joey Lyle  [address] [facility] [address]      Dear Colleague,    Thank you for referring your patient, Joey Lyle, to the [facility] [place]. Please see a copy of my visit note below.    SPORTS MEDICINE CLINIC NEW PATIENT VISIT    REFERRAL SOURCE: No ref. provider found    PATIENT'S GOAL FOR APPOINTMENT: Wants to know if the redness is concerning or not    HISTORY OF PRESENT ILLNESS  Joey Lyle is a 69 year old male presenting as a new patient with right lower leg pain    When did problem start?/Trauma associated with onset?:  6/8/2025, patient fell on concrete and scraped his right lower leg.  There is tenderness to the area and redness in the area.  Patient has a history of cellulitis to the left lower extremity.    Location & description of pain:  Right lower leg, anterior, redness and swelling    Exacerbating factors: pressure to area    Remitting factors:rest    Previous Treatments:  -Medications: antibiotic cream  -Rehabilitation: non  -Durable Medical Equipment:no  -Injections: no  -Modalities: no  -Other Providers seen: no    Sports, Hobbies, Employment:  Sales    Average hours of sleep per night: 6 + a nap for 1 hour    Average minutes of exercise per day: Daily - no    Area of Problem  Date 1  Date 2 Date 3 Date 4 Date 5   Function Ability in last week - % of Baseline (0 is worst & 100 is best)        Sport/Activity Ability in last week - % of Baseline (0 is worst & 100 is best)        Pain Level in the last week (0 is best & 10 is worst)          Additional Information of consideration:  -Poor Balance? Yes, has arthritis in right knee, being treated at [facility] for this.  -Numbness/paresthesias in extremities? no    MEDICATIONS    Current Outpatient Medications:      furosemide (LASIX) 40 MG tablet, Take 1 tablet (40 mg) by mouth daily for 10 days, Disp: 10 tablet, Rfl: 0     losartan-hydrochlorothiazide (HYZAAR) 50-12.5 MG tablet, TAKE 1 TABLET  BY MOUTH EVERY DAY - OVERDUE FOR CLINIC VISIT PER MD, Disp: 90 tablet, Rfl: 1    ALLERGIES  Allergies   Allergen Reactions     Sulfa Antibiotics Dermatitis and Rash       PAST MEDICAL HISTORY  No past medical history on file.    PAST SURGICAL HISTORY  No past surgical history on file.    SOCIAL HISTORY  Social History     Tobacco Use     Smoking status: Former     Current packs/day: 0.00     Types: Cigarettes     Quit date:      Years since quittin.4     Smokeless tobacco: Never   Substance Use Topics     Alcohol use: Yes     Drug use: Never       FAMILY HISTORY  No family history on file.    REVIEW OF SYSTEMS  Complete 12 system Review of Systems performed and was negative except for HPI.    VITALS  There were no vitals filed for this visit.    PHYSICAL EXAMINATION   General: Age appropriate appearing, no acute distress  HEENT: normocephalic, atraumatic, sclera non-icteric  Skin: No open skin lesion noted in visible areas.  Respiratory: Non labored breathing, No wheezes.  Cardiac/Vessels: No edema, cyanosis, clubbing noted in all extremities.  Lymph: No palpable lymph node swelling noted around the affected area.  Mental: There was no signs of aberrant behaviors noted. Patient was pleasant throughout the encounter.    Functional Movements: Non-antalgic gait appreciated.     MUSCULOSKELETAL:   Inspection: Right shin and calf erythema, warmth and swelling, pictures uploaded to patient's chart  Palpation: TTP right posterior calf    IMPRESSION  Joey Lyle is a very pleasant 69 year old male who is presenting today with right lower leg erythema with warmth and swelling concerning for cellulitis.   Superimposed DVT can't be ruled out without additional testing.    PLAN  The following was discussed with the patient:  Activity Modification: As tolerated  Imaging/Tests: Doppler Ultrasound ordered to evaluate for DVT  Medication: Keflex prescription sent to pharmacy for cellulitis   Follow-up Plan: Follow-up with  PCP next week for evaluation of antibiotic response and management as needed  Resources Provided: Written and verbal information detailing above findings and plan provided including after visit summary.    They were encouraged to message me on [employer] whenever they needed.    The patient was in agreement with this plan. All questions were answered to the best of my ability.    Total time (face-to-face and non-face-to-face) spent on today's visit was 30 minutes. This included preparation for the visit (i.e. reviewing test results), performance of a medically  appropriate history and examination, placing orders for medications, tests or other procedures, and discussing the plan of care with the patient. This time is exclusive of procedures performed and time spent teaching.     Saida Peacock MD, [facility]  Sports Medicine Attending Physician  Department of Physical Medicine & Rehabilitation        Again, thank you for allowing me to participate in the care of your patient.        Sincerely,        Saida Peacock MD    Electronically signed

## 2025-06-23 ENCOUNTER — TELEPHONE (OUTPATIENT)
Dept: ORTHOPEDICS | Facility: CLINIC | Age: 70
End: 2025-06-23
Payer: COMMERCIAL

## 2025-06-23 ENCOUNTER — ANCILLARY PROCEDURE (OUTPATIENT)
Dept: ULTRASOUND IMAGING | Facility: CLINIC | Age: 70
End: 2025-06-23
Attending: STUDENT IN AN ORGANIZED HEALTH CARE EDUCATION/TRAINING PROGRAM
Payer: COMMERCIAL

## 2025-06-23 DIAGNOSIS — M79.604 PAIN AND SWELLING OF RIGHT LOWER EXTREMITY: ICD-10-CM

## 2025-06-23 DIAGNOSIS — M79.89 PAIN AND SWELLING OF RIGHT LOWER EXTREMITY: ICD-10-CM

## 2025-06-23 PROCEDURE — 93971 EXTREMITY STUDY: CPT | Mod: RT

## 2025-06-23 NOTE — TELEPHONE ENCOUNTER
I made an outbound phone call and left a voicemail for the patient.  Brief message, no consent to communicate on file.  Recommended patient looks her [place] message from our team.    Regarding patient appointment for 6/24/2025, it is recommended that the patient finishes his antibiotics and follows up with his primary care regarding the cellulitis/ultrasound for DVT.  Primary care referral was placed at last visit, the scheduling number is [phone].     Will send patient a detailed Matchup message.  If patient calls back, please relay the message above and encourage he cancel his appointment with Dr. Yeo Zach Turner, ATC

## 2025-06-23 NOTE — TELEPHONE ENCOUNTER
I attempted to call the patient again regarding their appointment with Dr. Yeo tomorrow. No answer, left generic message to call us back or read his [place]art message from our team regarding his appointment. Makenzie Perla ATC on 6/23/2025 at 4:50 PM

## 2025-06-23 NOTE — TELEPHONE ENCOUNTER
Per Dr. Peacock, patient was started on antibiotics 6/21/25 and needs to follow up with PCP for this condition. Not appropriate follow up for sports med.   Patient will need called or messaged today.

## 2025-06-23 NOTE — TELEPHONE ENCOUNTER
Other: TE sent per protocol due to patient having  Pain and swelling of right lower extremity and Cellulitis of right lower extremity. Patient is scheduled with Dr. Yeo on 06/24/25. Please evaluate and see if appointment is appropriate.     Could we send this information to [facility] or would you prefer to receive a phone call?:   Patient would prefer a phone call   Okay to leave a detailed message?: Yes at Home number on file [phone] (home)

## 2025-06-24 ENCOUNTER — OFFICE VISIT (OUTPATIENT)
Dept: FAMILY MEDICINE | Facility: CLINIC | Age: 70
End: 2025-06-24
Payer: COMMERCIAL

## 2025-06-24 VITALS
DIASTOLIC BLOOD PRESSURE: 81 MMHG | HEART RATE: 90 BPM | TEMPERATURE: 96.3 F | WEIGHT: 315 LBS | SYSTOLIC BLOOD PRESSURE: 128 MMHG | RESPIRATION RATE: 18 BRPM | OXYGEN SATURATION: 95 % | HEIGHT: 76 IN | BODY MASS INDEX: 38.36 KG/M2

## 2025-06-24 DIAGNOSIS — I89.0 CHRONIC ACQUIRED LYMPHEDEMA: ICD-10-CM

## 2025-06-24 DIAGNOSIS — E66.01 MORBID OBESITY WITH BMI OF 50.0-59.9, ADULT (H): ICD-10-CM

## 2025-06-24 DIAGNOSIS — I10 BENIGN ESSENTIAL HYPERTENSION: Primary | ICD-10-CM

## 2025-06-24 LAB
ALBUMIN SERPL BCG-MCNC: 4.1 G/DL (ref 3.5–5.2)
ALP SERPL-CCNC: 59 U/L (ref 40–150)
ALT SERPL W P-5'-P-CCNC: 17 U/L (ref 0–70)
ANION GAP SERPL CALCULATED.3IONS-SCNC: 10 MMOL/L (ref 7–15)
AST SERPL W P-5'-P-CCNC: 20 U/L (ref 0–45)
BILIRUB SERPL-MCNC: 0.6 MG/DL
BUN SERPL-MCNC: 21.5 MG/DL (ref 8–23)
CALCIUM SERPL-MCNC: 9.6 MG/DL (ref 8.8–10.4)
CHLORIDE SERPL-SCNC: 101 MMOL/L (ref 98–107)
CREAT SERPL-MCNC: 1.18 MG/DL (ref 0.67–1.17)
EGFRCR SERPLBLD CKD-EPI 2021: 68 ML/MIN/1.73M2
GLUCOSE SERPL-MCNC: 92 MG/DL (ref 70–99)
HCO3 SERPL-SCNC: 29 MMOL/L (ref 22–29)
POTASSIUM SERPL-SCNC: 4.4 MMOL/L (ref 3.4–5.3)
PROT SERPL-MCNC: 6.8 G/DL (ref 6.4–8.3)
SODIUM SERPL-SCNC: 140 MMOL/L (ref 135–145)

## 2025-06-24 PROCEDURE — 3079F DIAST BP 80-89 MM HG: CPT | Performed by: INTERNAL MEDICINE

## 2025-06-24 PROCEDURE — 36415 COLL VENOUS BLD VENIPUNCTURE: CPT | Performed by: INTERNAL MEDICINE

## 2025-06-24 PROCEDURE — 99214 OFFICE O/P EST MOD 30 MIN: CPT | Performed by: INTERNAL MEDICINE

## 2025-06-24 PROCEDURE — 80053 COMPREHEN METABOLIC PANEL: CPT | Performed by: INTERNAL MEDICINE

## 2025-06-24 PROCEDURE — 1126F AMNT PAIN NOTED NONE PRSNT: CPT | Performed by: INTERNAL MEDICINE

## 2025-06-24 PROCEDURE — 3074F SYST BP LT 130 MM HG: CPT | Performed by: INTERNAL MEDICINE

## 2025-06-24 PROCEDURE — G2211 COMPLEX E/M VISIT ADD ON: HCPCS | Performed by: INTERNAL MEDICINE

## 2025-06-24 RX ORDER — LOSARTAN POTASSIUM AND HYDROCHLOROTHIAZIDE 12.5; 5 MG/1; MG/1
1 TABLET ORAL DAILY
Qty: 90 TABLET | Refills: 1 | Status: SHIPPED | OUTPATIENT
Start: 2025-06-24

## 2025-06-24 ASSESSMENT — PAIN SCALES - GENERAL: PAINLEVEL_OUTOF10: NO PAIN (0)

## 2025-06-24 NOTE — PROGRESS NOTES
"  Assessment & Plan     Joey Lyle is a 69-year-old male with a history of benign essential hypertension, chronic acquired lymphedema, and morbid obesity. He presents with possible cellulitis of the right lower leg, currently being treated with cephalexin. His blood pressure is well-controlled on current medication, and he is advised to continue with weight management efforts.    Possible cellulitis:  - Susceptible to cellulitis; currently on cephalexin, which appears to be effective.  - Finish the course of cephalexin.    Benign essential hypertension:  - Blood pressure was 128/81 today, likely controlled due to current medication.  - Conduct blood tests to check electrolytes and kidney function due to blood pressure medication.    Chronic acquired lymphedema:  - Lymphedema therapy recommended.  - Referral to [facility] for lymphedema therapy; preference for [place] location.    Pt is strongly encouraged to RTC for preventive care visit.  Can discuss weight management also.      Consent was obtained from the patient to use an AI documentation tool in the creation of this note.    The longitudinal plan of care for the diagnosis(es)/condition(s) as documented were addressed during this visit. Due to the added complexity in care, I will continue to support Joey in the subsequent management and with ongoing continuity of care.    MED REC REQUIRED  Post Medication Reconciliation Status: discharge medications reconciled and changed, per note/orders  BMI  Estimated body mass index is 49.66 kg/m  as calculated from the following:    Height as of this encounter: 1.93 m (6' 4\").    Weight as of this encounter: 185.1 kg (408 lb).             Subjective   Joey is a 69 year old, presenting for the following health issues:  Cellulitis (Right lower leg -- seen by Sports Medicine (not ER) )        6/24/2025     2:45 PM   Additional Questions   Roomed by Lulú OLIVER Joey Lyle, 69 " "years    Cellulitis  Joey fell and scraped his right lower leg earlier this month, leading to redness and swelling. He was seen by sports medicine on June 21, 2025, and started on antibiotics for possible cellulitis. The condition worsened by Saturday night, with significant swelling around the ankle, making it difficult to remove or wear shoes. The area is warm to the touch but not overly sensitive. Joey has a history of susceptibility to cellulitis, having had a similar issue 15 years ago. He has been using lotion on the affected area. An ultrasound was performed recently to rule out blood clots, with no signs detected. Joey is currently taking cephalexin four times a day and is prison through the prescribed course. He has also been using compression stockings to manage swelling, although they can cause discomfort. Overall notes improvement.    Weight and Swelling Management  Joey's weight has fluctuated, having increased to 430 pounds but now reduced to 408 pounds over the past three months. He is working towards a goal of 350 pounds due to a knee condition described as bone-on-bone.  He is currently on blood pressure medication, which includes a diuretic component, and wears compression stockings to help manage swelling.                  Objective    /81 (BP Location: Right arm, Patient Position: Chair, Cuff Size: Adult Large)   Pulse 90   Temp (!) 96.3  F (35.7  C) (Temporal)   Resp 18   Ht 1.93 m (6' 4\")   Wt (!) 185.1 kg (408 lb)   SpO2 95%   BMI 49.66 kg/m    There is no height or weight on file to calculate BMI.  Physical Exam   GEN NAD  ENT MMM no lesions  CV RRR  EXT ++bipedal edema with venous stasis changes  RLE with abrasions below knee. Increased erythema and slight warmth relative to the left.   PSYCH alert pleasant and cooperative            Signed Electronically by: Boris Wilcox MD    " Yes

## 2025-06-25 ENCOUNTER — RESULTS FOLLOW-UP (OUTPATIENT)
Dept: PEDIATRICS | Facility: CLINIC | Age: 70
End: 2025-06-25

## 2025-06-26 ENCOUNTER — NURSE TRIAGE (OUTPATIENT)
Dept: FAMILY MEDICINE | Facility: CLINIC | Age: 70
End: 2025-06-26
Payer: COMMERCIAL

## 2025-06-26 NOTE — TELEPHONE ENCOUNTER
Called pt informing of Dr. Wilcox's message. Informed to stop taking Cephalexin and begin taking PO Benadryl 50 mg TID x3 days and PO Pepcid 40 mg BID x3 days. Advised to be seen in clinic if rash worsens. Also advised to seek emergency services if developing respiratory symptoms.     Molly Christine RN on 6/26/2025 at 9:41 AM

## 2025-06-26 NOTE — TELEPHONE ENCOUNTER
"Nurse Triage SBAR    Is this a 2nd Level Triage? YES, LICENSED PRACTITIONER REVIEW IS REQUIRED    Situation: Patient calling in today reporting having a widespread rash specifically located in his skin folds and buttocks area but he describes it as \"all over\".  Patient has a history of severe cellulitis and was given cephalexin to treat the cellulitis from urgent care.  Patient has had reactions to sulfa antibiotics in the past and urgent care was aware of this so they gave him cephalexin instead.  Patient states that he is having no trouble breathing but the rash is severely itchy.  Patient skipped last night's dose and this morning's dose of antibiotic.  Patient will be awaiting recommendation.  Nurse recommended if breathing issues arise to go to ER.  Routing to PCP high-priority for further input  Background: Patient has had reactions to antibiotics in the past and cellulitis    Assessment: Patient may need assessment    Protocol Recommended Disposition:   See in Office Today    Recommendation: Routing to PCP    Routed to provider    Does the patient meet one of the following criteria for ADS visit consideration? No    Reason for Disposition   Hives or itching    Additional Information   Negative: Difficulty breathing or wheezing   Negative: Hoarseness or cough that started soon after 1st dose of drug   Negative: Swollen tongue that started soon after first dose of drug   Negative: Fever and purple or blood-colored spots or dots   Negative: Too weak or sick to stand   Negative: Sounds like a life-threatening emergency to the triager   Negative: Rash is only on 1 part of the body (localized)   Negative: Taking new non-prescription (OTC) antihistamine, decongestant, ear drops, eye drops, or other OTC cough/cold medicine   Negative: Taking new prescription antihistamine, allergy medicine, asthma medicine, eye drops, ear drops or nose drops   Negative: Rash started more than 3 days after stopping new prescription " "medicine   Negative: Swollen tongue   Negative: Widespread hives and onset < 2 hours of exposure to 1st dose of drug   Negative: Patient sounds very sick or weak to the triager   Negative: Pregnant   Negative: Rash beginning within 4 hours of a new prescription medication   Negative: Fever   Negative: Face or lip swelling   Negative: Purple or blood-colored spots or dots (no fever and sounds well to triager)   Negative: Joint pain or swelling   Negative: Bloody crusts on lips or in mouth   Negative: Large or small blisters on skin (i.e., fluid filled bubbles or sacs)    Answer Assessment - Initial Assessment Questions  1. APPEARANCE of RASH: \"Describe the rash.\" (e.g., spots, blisters, raised areas, skin peeling, scaly)     Red and itchy  2. SIZE: \"How big are the spots?\" (e.g., tip of pen, eraser, coin; inches, centimeters)      Not big \"red AND RASHY   3. LOCATION: \"Where is the rash located?\"      ALL OVER, BUTT   4. COLOR: \"What color is the rash?\" (Note: It is difficult to assess rash color in people with darker-colored skin. When this situation occurs, simply ask the caller to describe what they see.)      RED  5. ONSET: \"When did the rash begin?\"      MONDAY ITCH, YESTERDAY   6. FEVER: \"Do you have a fever?\" If Yes, ask: \"What is your temperature, how was it measured, and when did it start?\"      NO  7. ITCHING: \"Does the rash itch?\" If Yes, ask: \"How bad is the itch?\" (Scale 1-10; or mild, moderate, severe)      SEVERE  8. CAUSE: \"What do you think is causing the rash?\"     CEPHALEXIN  9. NEW MEDICINES: \"What new medicines are you taking?\" (e.g., name of antibiotic) \"When did you start taking this medication?\".      ANTIBIOTICS  10. OTHER SYMPTOMS: \"Do you have any other symptoms?\" (e.g., sore throat, fever, joint pain)        NO  11. PREGNANCY: \"Is there any chance you are pregnant?\" \"When was your last menstrual period?\"        NO    Protocols used: Rash - Widespread On Drugs-A-OH    "

## 2025-06-26 NOTE — TELEPHONE ENCOUNTER
Patient called back to confirm instructions     He is wondering If he need an alterative antibiotic sent in for the cellulitis? He is still having some redness and swelling on the leg so is concerned about stopping the antibiotic early

## 2025-06-26 NOTE — TELEPHONE ENCOUNTER
I believe he has had 5 days of antibiotics.  I think we can hold off for now.  If the redness or skin sensitivity worsens he should let us know and we can send in an alternative antibiotic.  I would choose doxycycline given the history of possible reaction with sulfa-based antibiotics and now potentially with the cephalosporin.

## 2025-06-26 NOTE — TELEPHONE ENCOUNTER
Called pt, left DVM regarding Dr. Wilcox's note below. Advised to call back with further questions or worsening of symptoms in the next day or 2.     Molly Christine RN on 6/26/2025 at 11:18 AM

## 2025-06-26 NOTE — TELEPHONE ENCOUNTER
Can hold the Cephalexin.  START:  OTC Benadryl (generic is fine).  50mg TID x 3 days  OTC Pepcid (generic is fine) 40mg BID x 3 days    IF rash worsens needs to be seen in 24-36 hours.  Boris Wilcox MD on 6/26/2025 at 9:29 AM

## 2025-07-09 ENCOUNTER — OFFICE VISIT (OUTPATIENT)
Dept: FAMILY MEDICINE | Facility: CLINIC | Age: 70
End: 2025-07-09
Payer: COMMERCIAL

## 2025-07-09 VITALS
HEIGHT: 76 IN | TEMPERATURE: 97.4 F | DIASTOLIC BLOOD PRESSURE: 98 MMHG | RESPIRATION RATE: 18 BRPM | OXYGEN SATURATION: 93 % | WEIGHT: 315 LBS | HEART RATE: 88 BPM | BODY MASS INDEX: 38.36 KG/M2 | SYSTOLIC BLOOD PRESSURE: 152 MMHG

## 2025-07-09 DIAGNOSIS — I10 BENIGN ESSENTIAL HYPERTENSION: ICD-10-CM

## 2025-07-09 DIAGNOSIS — I89.0 CHRONIC ACQUIRED LYMPHEDEMA: ICD-10-CM

## 2025-07-09 DIAGNOSIS — E66.01 MORBID OBESITY (H): ICD-10-CM

## 2025-07-09 DIAGNOSIS — T78.40XD ALLERGIC REACTION, SUBSEQUENT ENCOUNTER: ICD-10-CM

## 2025-07-09 DIAGNOSIS — L03.115 CELLULITIS OF RIGHT LEG: Primary | ICD-10-CM

## 2025-07-09 PROCEDURE — 3080F DIAST BP >= 90 MM HG: CPT | Performed by: PHYSICIAN ASSISTANT

## 2025-07-09 PROCEDURE — 1111F DSCHRG MED/CURRENT MED MERGE: CPT | Performed by: PHYSICIAN ASSISTANT

## 2025-07-09 PROCEDURE — 3077F SYST BP >= 140 MM HG: CPT | Performed by: PHYSICIAN ASSISTANT

## 2025-07-09 PROCEDURE — 99213 OFFICE O/P EST LOW 20 MIN: CPT | Performed by: PHYSICIAN ASSISTANT

## 2025-07-09 PROCEDURE — 1126F AMNT PAIN NOTED NONE PRSNT: CPT | Performed by: PHYSICIAN ASSISTANT

## 2025-07-09 RX ORDER — CETIRIZINE HYDROCHLORIDE 10 MG/1
20 TABLET ORAL
COMMUNITY
Start: 2025-07-02 | End: 2025-07-23

## 2025-07-09 ASSESSMENT — PAIN SCALES - GENERAL: PAINLEVEL_OUTOF10: NO PAIN (0)

## 2025-07-09 NOTE — PROGRESS NOTES
Assessment and Plan:     (L03.115) Cellulitis of right leg  (primary encounter diagnosis)  Comment: initially seen for this on 6/21/25, had venous duplex US performed which was negative for DVT, was placed on keflex then developed hives, he stopped the keflex and cellulitis worsened, was seen at ED on 6/27/25, admitted for IV abx, deescalated to oral linezolid, of note had KIM which was though to be due to infection/dehydration, GFR improved with IVF, rash was treated with antihistamines and topical steroids, he improved during hospital course and was dsicahrged home on 7/5/25 on linezolide which he finished three days ago, he note significant improvement in swelling, redness and pain of RLE, no fever/chills, picture below  Plan: Wound Care Referral        Continue current wound cares, elevate as much as possible   Be seen if symptoms worsen/return    (I89.0) Chronic acquired lymphedema  Comment: pcp ordered lymphedema therapy  Plan: start above    (E66.01) Morbid obesity (H)  Comment: he is working on weight loss  Plan:     (T78.40XD) Allergic reaction, subsequent encounter  Comment: keflex added to allergy list  Plan:     (I10) Benign essential hypertension  Comment: bp up today, he did have some coffee prior to visit  Plan: check bp at home regularly for next month (wife is PT and can help with this), send readings to pcp in one month     [facility] Same Day Provider   34 minutes on the day of the encounter doing chart review, history and exam, documentation and further activities as noted above.    Leonard Cook is a 69 year old, presenting for the following health issues:  Hospital F/U    HPI      Here for hospital follow-up  The following is from the discharge summary from recent [place] admission:    Discharge Hospital: [facility]   Discharge Provider: Miki Lau M.D.   Discharge Provider Team: Hospital Internal Medicine ([facility]) - T Medicine 4 ([facility])          Primary Care Providers:  Elsewhere, Pcp(General)   No address on file  PCP Phone Number: None  PCP Fax Number: None     Admission Date: 6/27/2025     Discharge Date: 07/02/25     PRINCIPAL DIAGNOSIS  Cellulitis     SECONDARY DIAGNOSES  Principal Problem:    Cellulitis  Resolved Problems:    * No resolved hospital problems. *        DISCHARGE DISPOSITION  Home or Self Care [1]     ACTIVE ISSUES REQUIRING FOLLOW UP  Discharge Notes from your Provider Team      You were discharged from the Medicine Service. Please identify this service name if you call with questions after hospitalization.      Your primary hospital diagnosis: Non-purulent cellulitis     Key things to remember:   -The natural progression of the rash is to spread out to the extremities from the trunk. This is normal and does not indicate rash worsening.  - Wound care is medically necessary to improve functional mobility and independence.     Wound Care Recommendations:  - Daytime Compression Program: Daytime compression bandaging rosidal soft padding, 8cm short stretch wrap and 10cm short stretch wrap from foot to just below knee  - Nighttime Compression Program: Nighttime compression bandaging (see daytime)  - Additional Management: compression stockings at home as able     Medication changes:  - Please continue medications as indicated in medication reconciliation   START these medications  - Clobetasol 0.05% ointment twice a day on trunk and extremities for 3 more weeks.  - hydrocortisone 2.5% ointment twice a day to face and body folds for 3 more weeks.  - Cetirizine 10 mg twice a day, can increase to 20 mg twice a day if itching worsens.  STOP these medications  - Kephlex     Follow-up:  - Please attend follow up appointments as listed in after visit summary  - Risk factor modification as per primary care provider, with whom you should follow up after discharge  - Follow-up with your PCP on 7/9.  - Referral placed for outpatient lymphedema clinic  in [place]. You should receive a call for your appointmen      REASON FOR ADMISSION  Cellulitis     HOSPITAL COURSE  Mr. Lyle is a 69 y.o M with PMHx significant for chronic lymphedema, venous insufficiency, hypertension, morbid obesity admitted for non-purulent cellulitis most likely secondary to drug eruption from cephalexin.     Patient started on IV ceftriaxone due to non purulence. ACE wraps and wound care consult obtained for chronic lymphedema. Blood cultures were negative. He developed bullae on 6/29 and was started on IV vancomycin. Antibiotics were de-escalated per ID recommendation and he was started on PO linezolid 600 mg every 8 hours on 7/1/25 (ending on 7/6/25). Dermatology was consulted further recommendations on the drug associated rash. Patient noted pruritis and was recommended to start Clobetasol 0.05% ointment twice a day on trunk and extremities for 3 more weeks, hydrocortisone 2.5% ointment twice a day to face and body folds for 3 more weeks, and Cetirizine 10 mg twice a day, can increase to 20 mg twice a day if itching worsens. Bullae continue to improve on discharge (see media).     Patient had softer blood pressures on arrival, but this improved and home medications restarted. On discharge, patient was recommended to start outpatient lymphedema clinic. Wound care recommendations per PMR note and in post discharge recommendations.     CONSULTS ORDERED DURING THIS ADMISSION  IP CONSULT TO CNS WOUND CARE  IP CONSULT TO CARE MANAGEMENT  IP CONSULT TO INFECTIOUS DISEASES  IP CONSULT TO DERMATOLOGY     CONDITION AT DISCHARGE  Improved    He has been doing well at home  His rash is improving and is almost resolved  The cellulitis of RLE has improved significantly, redness, swelling and pain have improved   He finished linezolid on Sunday, 3 days ago  He denies fever/chills, spreading redness, new rash     His pcp ordered lymphedema therapy    Objective    BP (!) 149/95 (BP Location: Left arm)   " Pulse 88   Temp 97.4  F (36.3  C) (Temporal)   Resp 18   Ht 1.93 m (6' 4\")   Wt (!) 186 kg (410 lb)   SpO2 93%   BMI 49.91 kg/m    Body mass index is 49.91 kg/m .    Physical Exam     GENERAL: healthy, alert and no distress  RESP: lungs clear to auscultation - no rales, no rhonchi, no wheezes  CV: regular rates and rhythm, normal S1 S2, no S3 or S4 and no murmur, no click or rub   MS: extremities- no gross deformities noted, severe lymphedema bilat lower extremities, healing wounds of anterior RLE, no drainage, no abscess, see picture, erythema and swelling much improved per patient       Media Information      Document Information    Other: Photograph   Right lower extremity   07/09/2025 10:17 AM   Attached To:   Office Visit on 7/9/25 with Ivonne Ceballos PA-C   Source Information    Ivonne Ceballos PA-C  [facility]   Document History              Signed Electronically by: Ivonne Jain PA-C    "

## 2025-07-09 NOTE — PATIENT INSTRUCTIONS
Continue current medications    Watch salt intake    Limit caffeine intake    Check BP 3-4 times per week, record and sent recordings to Dr. Wilcox in one month    Elevate legs as much as possible    Continue dressing changes    Follow-up with lymphedema clinic

## 2025-07-09 NOTE — NURSING NOTE
"BP:  BP (!) 149/95 (BP Location: Left arm)   Pulse 88   Temp 97.4  F (36.3  C) (Temporal)   Resp 18   Ht 1.93 m (6' 4\")   Wt (!) 186 kg (410 lb)   SpO2 93%   BMI 49.91 kg/m       88  Disposition: provider notified while patient in the clinic   "

## 2025-07-11 ENCOUNTER — TELEPHONE (OUTPATIENT)
Dept: WOUND CARE | Facility: CLINIC | Age: 70
End: 2025-07-11
Payer: COMMERCIAL

## 2025-07-11 NOTE — TELEPHONE ENCOUNTER
Consult received via [employer] from Ivonne Ceballos PA-C for wound of the right lower leg.    Does the patient have an open and draining wound? Yes    Please schedule with Elizabeth Banegas NP, Zachariah Napier M.D., or Jigar Garcia M.D. at [facility] for next available appointment.    **For all providers, please schedule a follow up 4 weeks after initial appointment. (2-3 weeks for Dr. Swartz and Dr. Barahona)**  --If unable to schedule within 2-3 weeks then please place on cancellation list--    Is the patient able to make their own medical decisions? Yes    Is patient a BHUMIK[employer] lift? PLEASE INQUIRE WHEN MAKING THE APPOINTMENT AND PUT IN APPOINTMENT NOTES    Routing to  Wound Healing Scheduling.

## 2025-07-17 ENCOUNTER — THERAPY VISIT (OUTPATIENT)
Dept: OCCUPATIONAL THERAPY | Facility: CLINIC | Age: 70
End: 2025-07-17
Attending: INTERNAL MEDICINE
Payer: COMMERCIAL

## 2025-07-17 DIAGNOSIS — I89.0 LYMPHEDEMA: Primary | ICD-10-CM

## 2025-07-17 PROCEDURE — 97165 OT EVAL LOW COMPLEX 30 MIN: CPT | Mod: GO | Performed by: OCCUPATIONAL THERAPIST

## 2025-07-17 PROCEDURE — 97140 MANUAL THERAPY 1/> REGIONS: CPT | Mod: GO | Performed by: OCCUPATIONAL THERAPIST

## 2025-07-17 NOTE — PROGRESS NOTES
OCCUPATIONAL THERAPY EVALUATION  Type of Visit: Evaluation       Fall Risk Screen:  Have you fallen 2 or more times in the past year?: No  Have you fallen and had an injury in the past year?: Yes  Is patient receiving Physical Therapy Services?: No  Fall screen comments: Fall circumstantial; no PT eval/service needed    Subjective        Presenting condition or subjective complaint: Just treated for cellitus hospital stay  Date of onset: 06/24/25    Relevant medical history: Bladder or bowel problems   Dates & types of surgery:      Prior diagnostic imaging/testing results: X-ray     Prior therapy history for the same diagnosis, illness or injury: No      Prior Level of Function  Transfers: Independent  Ambulation: Independent  ADL: Independent  IADL: Driving, Finances, Work, Yard work    Living Environment  Social support: With a significant other or spouse   Type of home: House   Stairs to enter the home: No       Ramp: No   Stairs inside the home: Yes 10 Is there a railing: Yes     Help at home: None  Equipment owned:       Employment: Yes Sales  Hobbies/Interests: [email or web address]    Patient goals for therapy: Golf    Pain assessment: Pt reports no pain with LLE/BLE lymphedema     Objective       EDEMA EVALUATION  Additional history:  Body part affected by edema: Leg  If cancer related, treatment:    If not cancer related, problems with veins or cause of swelling: [place]  Distance able to walk: Half a block  Time able to stand: 10-15 Min  Sensation problems in hands/feet: Yes Numb feet  Edema etiology: Infection, s/s venous/phlebolymphedema present    FUNCTIONAL SCALES   Lymphedema Life Impact Scale (LLIS): 31    Cognitive Status Examination  Orientation: Oriented to person, place and time   Level of Consciousness: Alert  Follows Commands and Answers Questions: 100% of the time  Personal Safety and Judgement: Intact  Memory: Intact    EDEMA  Skin Condition: Dryness, Hemosiderin deposits, Pitting  Scar: No  Capillary  Refill: Symmetrical  Radial Pulse: NT'd  Dorsal Pedal Pulse: Nt'd  Stemmer Sign: +  Ulceration: dry scabs RLE ant shin    GIRTH MEASUREMENTS: Refer to separate girth measurement flowsheet for specific measurements.    BUE vol approx same; LLE vol greater RLE vol    RANGE OF MOTION: LE ROM WFL  UE ROM WFL  STRENGTH: UE Strength WFL, LE Strength WFL  POSTURE: WFL  PALPATION:   ACTIVITIES OF DAILY LIVING: Ind  BED MOBILITY: WFL  TRANSFERS: WFL  GAIT/LOCOMOTION: Ind  BALANCE: WFL  SENSATION: no issues reported or identified  VASCULAR: s/s venous stasis or CVI BLE  COORDINATION: WNL  MUSCLE TONE: WNL    Assessment & Plan   CLINICAL IMPRESSIONS  Medical Diagnosis: lymphedema    Treatment Diagnosis: lymphedema    Impression/Assessment: Pt presents with BLE and long LLE lymphedema history after cellulitis infection approx 15 years ago'; recejnt RLE cellulitis. BLE show discoloration indicative of venous involvement in BLE swelling. No cancer care history. Pt reports not liking Drs and has not had heart checked regarding possible cardiac influence on BLE swelling.    Clinical Decision Making (Complexity):  Assessment of Occupational Performance: 1-3 Performance Deficits  Occupational Performance Limitations: dressing, functional mobility, work, and infection risk  Clinical Decision Making (Complexity): Low complexity    PLAN OF CARE  Treatment Interventions:  Interventions: Therapeutic Exercise, Manual Therapy    Long Term Goals   OT Goal 1  Goal Identifier: GCB Donaditya  Goal Description: Pt/family to demo Ind yoly/doff GCbs for Ind building with home management BLE lymphedema needed to reduce vol retention and therefore reduce risk skin infections  Rationale: In order to maximize safety and independence with ADL/IADLs;In order to maximize safety and independence with functional transfers and functional mobility within the home or community  Target Date: 10/09/25  OT Goal 2  Goal Identifier: GCB Wearing  Goal Description: Pt  to tolerate approx 23/24hrs daily GCB wearing to reduce vol in BLE and reduce risk skin infections and promote more ease with walking/mobility tasks  Rationale: In order to maximize safety and independence with ADL/IADLs;In order to maximize safety and independence with functional transfers and functional mobility within the home or community  Target Date: 10/09/25  OT Goal 3  Goal Identifier: MLD/HEP  Goal Description: Pt to demo Ind with self MLD and HEP for max reductions in BLE lymphedema needed to reduce risk skin infections and promote more ease with mobility  Rationale: In order to maximize safety and independence with ADL/IADLs;In order to maximize safety and independence with functional transfers and functional mobility within the home or community  Target Date: 10/09/25  OT Goal 4  Goal Identifier: Garments  Goal Description: Pt to demo Ind yoly/doff/care compression garments for Ind buildding with management BLE lymphedema needed to reduce risk skin infections and promote more ease with mobility  Rationale: In order to maximize safety and independence with ADL/IADLs;In order to maximize safety and independence with functional transfers and functional mobility within the home or community  Target Date: 10/09/25  OT Goal 5  Goal Identifier: Reductions  Goal Description: Pt to display total BLE volume reduction of .5L+ for reductions needed to reduce risk skin infections and promote more ease with mobility  Rationale: In order to maximize safety and independence with ADL/IADLs;In order to maximize safety and independence with functional transfers and functional mobility within the home or community  Target Date: 10/09/25      Frequency of Treatment: 1x/wk; 2x/wk  Duration of Treatment: 12 weeks with ability to taper/add on as needed     Recommended Referrals to Other Professionals: none at this time  Education Assessment: Learner/Method: Patient;Listening     Risks and benefits of evaluation/treatment have  been explained.   Patient/Family/caregiver agrees with Plan of Care.     Evaluation Time:    OT Eval, Low Complexity Minutes (82340): 20       Signing Clinician: Librado HOWE [facility]                                                                                   OUTPATIENT OCCUPATIONAL THERAPY      PLAN OF TREATMENT FOR OUTPATIENT REHABILITATION   Patient's Last Name, First Name, Joey Olsen YOB: 1955   Provider's Name   [facility]   Medical Record No.  [id number]     Onset Date: 06/24/25 Start of Care Date: 07/17/25     Medical Diagnosis:  lymphedema      OT Treatment Diagnosis:  lymphedema Plan of Treatment  Frequency/Duration:1x/wk; 2x/wk/12 weeks with ability to taper/add on as needed    Certification date from 07/17/25   To 10/09/25        See note for plan of treatment details and functional goals     Librado Hernandez                         I CERTIFY THE NEED FOR THESE SERVICES FURNISHED UNDER        THIS PLAN OF TREATMENT AND WHILE UNDER MY CARE     (Physician attestation of this document indicates review and certification of the therapy plan).              Referring Provider:  Boris Wilcox    Initial Assessment  See Epic Evaluation- 07/17/25

## 2025-07-29 ENCOUNTER — THERAPY VISIT (OUTPATIENT)
Dept: OCCUPATIONAL THERAPY | Facility: CLINIC | Age: 70
End: 2025-07-29
Attending: INTERNAL MEDICINE
Payer: COMMERCIAL

## 2025-07-29 DIAGNOSIS — I89.0 LYMPHEDEMA: Primary | ICD-10-CM

## 2025-07-29 PROCEDURE — 97140 MANUAL THERAPY 1/> REGIONS: CPT | Mod: GO | Performed by: OCCUPATIONAL THERAPIST

## 2025-07-31 ENCOUNTER — THERAPY VISIT (OUTPATIENT)
Dept: OCCUPATIONAL THERAPY | Facility: CLINIC | Age: 70
End: 2025-07-31
Attending: INTERNAL MEDICINE
Payer: COMMERCIAL

## 2025-07-31 DIAGNOSIS — I89.0 LYMPHEDEMA: Primary | ICD-10-CM

## 2025-07-31 PROCEDURE — 97140 MANUAL THERAPY 1/> REGIONS: CPT | Mod: GO | Performed by: OCCUPATIONAL THERAPIST

## 2025-08-05 ENCOUNTER — THERAPY VISIT (OUTPATIENT)
Dept: OCCUPATIONAL THERAPY | Facility: CLINIC | Age: 70
End: 2025-08-05
Attending: INTERNAL MEDICINE
Payer: COMMERCIAL

## 2025-08-05 DIAGNOSIS — I89.0 LYMPHEDEMA: Primary | ICD-10-CM

## 2025-08-05 PROCEDURE — 97140 MANUAL THERAPY 1/> REGIONS: CPT | Mod: GO | Performed by: OCCUPATIONAL THERAPIST

## 2025-08-07 ENCOUNTER — THERAPY VISIT (OUTPATIENT)
Dept: OCCUPATIONAL THERAPY | Facility: CLINIC | Age: 70
End: 2025-08-07
Attending: INTERNAL MEDICINE
Payer: COMMERCIAL

## 2025-08-07 DIAGNOSIS — I89.0 LYMPHEDEMA: Primary | ICD-10-CM

## 2025-08-07 PROCEDURE — 97140 MANUAL THERAPY 1/> REGIONS: CPT | Mod: GO | Performed by: OCCUPATIONAL THERAPIST
